# Patient Record
(demographics unavailable — no encounter records)

---

## 2025-02-08 NOTE — HISTORY OF PRESENT ILLNESS
[FreeTextEntry1] : Stage IC grade 3, clear cell ovarian cancer    Status post BHUMIKA/BSO/staging on December 13, 2007    Status post 6 cycles of Taxol and carboplatin, completed May 2008.     Medical oncology - Dr. Nabeel Reina    PCP-Dr Ara Vizcarra        Mrs. Boyd returns for followup feeling well, reporting no GI or  concerns and no VB, VD, or pain. She continues to see Dr. Nabeel Reina for ongoing breast cancer surveillance and medical oncology care. She is aware that I have no recent  from [facility]. She is to see him shortly in f/u.        [facility]gm Sept 2024 -nl (letter).           =  No recent value, Nov 2017=7.7. Dec 2013=11.9, Nov 2012 = 6.5, 3/2012=8 U/ml, 6.7 U/ml from Sept 2011. May 2011 = 7.6 U/ml.     CEA = 2024 (Nov) = 3.0Dec 2013=2.5, Nov 2012 =1.5, Mar 2012 =2.4     She has been trt for OP, she reports.

## 2025-02-08 NOTE — DISCUSSION/SUMMARY
[Reviewed Clinical Lab Test(s)] : Results of clinical tests were reviewed. [Reviewed Radiology Report(s)] : Radiology reports were reviewed. [FreeTextEntry1] : She remains well and clinically ANJELICA. -She continues to see the Med Onc and I once again asked for her to have the recent markers/notes forwarded, and she confirms BHM by Dr Reina.   -Her instructions were reviewed, incl her precautions.  -All questions were answered. -She will return to office in 12 months, sooner prn. -Effort for the visit includes the note prep, review of prior material, interview, exam, further documentation, and coordination of care.

## 2025-02-08 NOTE — PHYSICAL EXAM
[Chaperone Present] : A chaperone was present in the examining room during all aspects of the physical examination [Abnormal] : Examination of extremities for edema and/or varicosities: Abnormal [Absent] : Adnexa(ae): Absent [Normal] : Recto-Vaginal Exam: Normal [Fully active, able to carry on all pre-disease performance without restriction] : Status 0 - Fully active, able to carry on all pre-disease performance without restriction [35776] : A chaperone was present during the pelvic exam. [FreeTextEntry2] : Lisa [de-identified] : 1+ edema [de-identified] : Inc CDI [de-identified] : atrophy

## 2025-02-08 NOTE — PHYSICAL EXAM
[Chaperone Present] : A chaperone was present in the examining room during all aspects of the physical examination [Abnormal] : Examination of extremities for edema and/or varicosities: Abnormal [Absent] : Adnexa(ae): Absent [Normal] : Recto-Vaginal Exam: Normal [Fully active, able to carry on all pre-disease performance without restriction] : Status 0 - Fully active, able to carry on all pre-disease performance without restriction [08603] : A chaperone was present during the pelvic exam. [FreeTextEntry2] : Lisa [de-identified] : 1+ edema [de-identified] : Inc CDI [de-identified] : atrophy